# Patient Record
Sex: MALE | Race: WHITE | NOT HISPANIC OR LATINO
[De-identification: names, ages, dates, MRNs, and addresses within clinical notes are randomized per-mention and may not be internally consistent; named-entity substitution may affect disease eponyms.]

---

## 2020-07-02 ENCOUNTER — TRANSCRIPTION ENCOUNTER (OUTPATIENT)
Age: 50
End: 2020-07-02

## 2020-07-07 ENCOUNTER — TRANSCRIPTION ENCOUNTER (OUTPATIENT)
Age: 50
End: 2020-07-07

## 2023-11-16 PROBLEM — Z00.00 ENCOUNTER FOR PREVENTIVE HEALTH EXAMINATION: Status: ACTIVE | Noted: 2023-11-16

## 2024-02-07 ENCOUNTER — APPOINTMENT (OUTPATIENT)
Dept: PULMONOLOGY | Facility: CLINIC | Age: 54
End: 2024-02-07
Payer: COMMERCIAL

## 2024-02-07 VITALS
HEART RATE: 62 BPM | DIASTOLIC BLOOD PRESSURE: 74 MMHG | BODY MASS INDEX: 22.19 KG/M2 | TEMPERATURE: 97.6 F | WEIGHT: 155 LBS | HEIGHT: 70 IN | OXYGEN SATURATION: 98 % | SYSTOLIC BLOOD PRESSURE: 133 MMHG

## 2024-02-07 DIAGNOSIS — G47.00 INSOMNIA, UNSPECIFIED: ICD-10-CM

## 2024-02-07 DIAGNOSIS — Z78.9 OTHER SPECIFIED HEALTH STATUS: ICD-10-CM

## 2024-02-07 PROCEDURE — 99203 OFFICE O/P NEW LOW 30 MIN: CPT

## 2024-02-07 NOTE — CONSULT LETTER
[Dear  ___] : Dear  [unfilled], [Courtesy Letter:] : I had the pleasure of seeing your patient, [unfilled], in my office today. [Please see my note below.] : Please see my note below. [Consult Closing:] : Thank you very much for allowing me to participate in the care of this patient.  If you have any questions, please do not hesitate to contact me. [Sincerely,] : Sincerely, [FreeTextEntry3] : Daisy Diggs MD  [employer] at [facility] Pulmonary, Critical Care, and Sleep Medicine

## 2024-02-07 NOTE — HISTORY OF PRESENT ILLNESS
[FreeTextEntry1] : Mr. Velasco is a 54 y/o male with PMH of depression and anxiety who presents for evaluation of insomnia. He states that he would have these frequent episodes of trouble staying asleep and sometimes falling asleep. However, over the last several months he has been undergoing CBT which has significantly improved his sleep and he says for the last several months it is much improved. He denies significant smoking history. He drinks about 1 drink a night. He has family history of heart disease and mental illness in both sides. Does not snore as much as he used to and as he has increased his exercise over the last few years it has come down.   ESS of 8 [ESS] : 8

## 2024-02-07 NOTE — ASSESSMENT
[FreeTextEntry1] : 52 y/o male with PMH of anxiety and depression being managed for insomnia. He has been doing well with CBTi and overall his sleep is improved significantly. Instructed him on proper sleep hygiene including limiting alcohol, sedating, and stimulating agents in the evenings, limiting blue/white light before bed time, limiting caffeine intake. Discussed that his insomnia may also be sequalae of untreated depression/anxiety and if his anxiety returns it may need to be treated to help with his insomnia. Low suspicion for obstructive sleep apnea at this time. Discussed strategies such as jaw relaxation and paradoxical intention to help with insomnia.   F/u with Dr. Diggs as needed

## 2024-02-07 NOTE — PHYSICAL EXAM
[General Appearance - Well Developed] : well developed [Well Groomed] : well groomed [General Appearance - Well Nourished] : well nourished [Normal Oropharynx] : normal oropharynx [Neck Appearance] : the appearance of the neck was normal [] : no respiratory distress [Abnormal Walk] : normal gait [Exaggerated Use Of Accessory Muscles For Inspiration] : no accessory muscle use [Oriented To Time, Place, And Person] : oriented to person, place, and time

## 2024-02-07 NOTE — REVIEW OF SYSTEMS
[Negative] : Psychiatric [Difficulty Initiating Sleep] : difficulty falling asleep [Difficulty Maintaining Sleep] : difficulty maintaining sleep

## 2024-03-19 ENCOUNTER — APPOINTMENT (OUTPATIENT)
Dept: HEART AND VASCULAR | Facility: CLINIC | Age: 54
End: 2024-03-19
Payer: COMMERCIAL

## 2024-03-19 VITALS
HEART RATE: 59 BPM | TEMPERATURE: 96.9 F | DIASTOLIC BLOOD PRESSURE: 69 MMHG | WEIGHT: 158 LBS | OXYGEN SATURATION: 100 % | SYSTOLIC BLOOD PRESSURE: 128 MMHG | HEIGHT: 70 IN | BODY MASS INDEX: 22.62 KG/M2

## 2024-03-19 PROCEDURE — G2211 COMPLEX E/M VISIT ADD ON: CPT

## 2024-03-19 PROCEDURE — 93000 ELECTROCARDIOGRAM COMPLETE: CPT

## 2024-03-19 PROCEDURE — 99204 OFFICE O/P NEW MOD 45 MIN: CPT

## 2024-03-19 RX ORDER — ROSUVASTATIN CALCIUM 10 MG/1
10 TABLET, FILM COATED ORAL
Refills: 0 | Status: ACTIVE | COMMUNITY

## 2024-03-19 NOTE — REASON FOR VISIT
[Symptom and Test Evaluation] : symptom and test evaluation [FreeTextEntry1] : 54 year old man comes in for a visit. He recently had an abnormal Ca score and was started on a statin. He comes in for evaluation of chest pain and dyspnea. Symptoms are new. This is often noted with activity. Symptoms always improve at rest. No prior testing done. No issues with statin. FH of CAD noted.

## 2024-03-19 NOTE — PHYSICAL EXAM
[Well Developed] : well developed [Well Nourished] : well nourished [No Acute Distress] : no acute distress [Normal Conjunctiva] : normal conjunctiva [Normal Venous Pressure] : normal venous pressure [No Carotid Bruit] : no carotid bruit [Normal S1, S2] : normal S1, S2 [No Rub] : no rub [No Murmur] : no murmur [No Gallop] : no gallop [Good Air Entry] : good air entry [Clear Lung Fields] : clear lung fields [No Respiratory Distress] : no respiratory distress  [Soft] : abdomen soft [Non Tender] : non-tender [No Masses/organomegaly] : no masses/organomegaly [Normal Gait] : normal gait [Normal Bowel Sounds] : normal bowel sounds [No Edema] : no edema [No Cyanosis] : no cyanosis [No Clubbing] : no clubbing [No Varicosities] : no varicosities [No Rash] : no rash [No Skin Lesions] : no skin lesions [Moves all extremities] : moves all extremities [No Focal Deficits] : no focal deficits [Normal Speech] : normal speech [Normal memory] : normal memory [Alert and Oriented] : alert and oriented

## 2024-03-19 NOTE — DISCUSSION/SUMMARY
[FreeTextEntry1] : CP/SOB/dizziness check a stress echo if able to approve and schedule. VIVIENNE CROWE and I discussed his lipid panel and individualized target LDL goal. At this point, will do diet and exercise with anticipation of re-evaluating labs in 3-6 months EKG section of the chart --- secondary to symptoms above an electrocardiogram also known as an EKG was performed.  Risks and benefits discussed with the patient. Patient was given time and privacy to changed into a gown. Shortly after, standard 10 leads were applied and a [employer] system was used to perform the study. The results were subsequently reviewed by attending physician and discussed with the patient. The study showed a normal sinus rhythm and no ST-T suggestive of ischemia. Order for the EKG was placed in the chart. The results were documented. Billing submitted. [EKG obtained to assist in diagnosis and management of assessed problem(s)] : EKG obtained to assist in diagnosis and management of assessed problem(s)

## 2024-06-03 ENCOUNTER — APPOINTMENT (OUTPATIENT)
Dept: HEART AND VASCULAR | Facility: CLINIC | Age: 54
End: 2024-06-03
Payer: COMMERCIAL

## 2024-06-03 VITALS
DIASTOLIC BLOOD PRESSURE: 75 MMHG | HEART RATE: 64 BPM | OXYGEN SATURATION: 90 % | TEMPERATURE: 97 F | HEIGHT: 70 IN | WEIGHT: 149 LBS | BODY MASS INDEX: 21.33 KG/M2 | SYSTOLIC BLOOD PRESSURE: 124 MMHG

## 2024-06-03 DIAGNOSIS — E78.5 HYPERLIPIDEMIA, UNSPECIFIED: ICD-10-CM

## 2024-06-03 DIAGNOSIS — R07.2 PRECORDIAL PAIN: ICD-10-CM

## 2024-06-03 PROCEDURE — G2211 COMPLEX E/M VISIT ADD ON: CPT

## 2024-06-03 PROCEDURE — 93351 STRESS TTE COMPLETE: CPT

## 2024-06-03 PROCEDURE — 99214 OFFICE O/P EST MOD 30 MIN: CPT

## 2024-06-04 NOTE — DISCUSSION/SUMMARY
[FreeTextEntry1] : CP stress test reviewed. Inclined towards a conservative follow up in this patient. We had a careful discussion regarding diet and exercise. Will be happy to re-evaluate. HLD cont statin and labs with PMD carotid us if able to approve and schedule.

## 2024-06-04 NOTE — REASON FOR VISIT
[Symptom and Test Evaluation] : symptom and test evaluation [FreeTextEntry1] : 54 year old man comes in for a visit. He recently had an abnormal Ca score and was started on a statin. He comes in for evaluation of chest pain and dyspnea. Symptoms are new. This is often noted with activity. Symptoms always improve at rest. No prior testing done. No issues with statin. FH of CAD noted. Now, s/p stress ehco.

## 2024-10-16 ENCOUNTER — NON-APPOINTMENT (OUTPATIENT)
Age: 54
End: 2024-10-16

## 2024-10-16 ENCOUNTER — APPOINTMENT (OUTPATIENT)
Dept: HEART AND VASCULAR | Facility: CLINIC | Age: 54
End: 2024-10-16
Payer: COMMERCIAL

## 2024-10-16 VITALS
WEIGHT: 150.38 LBS | DIASTOLIC BLOOD PRESSURE: 73 MMHG | SYSTOLIC BLOOD PRESSURE: 131 MMHG | OXYGEN SATURATION: 100 % | HEIGHT: 70 IN | HEART RATE: 58 BPM | BODY MASS INDEX: 21.53 KG/M2 | TEMPERATURE: 97.9 F

## 2024-10-16 DIAGNOSIS — E78.5 HYPERLIPIDEMIA, UNSPECIFIED: ICD-10-CM

## 2024-10-16 DIAGNOSIS — Z87.898 PERSONAL HISTORY OF OTHER SPECIFIED CONDITIONS: ICD-10-CM

## 2024-10-16 DIAGNOSIS — R07.2 PRECORDIAL PAIN: ICD-10-CM

## 2024-10-16 PROCEDURE — 93880 EXTRACRANIAL BILAT STUDY: CPT

## 2024-10-16 PROCEDURE — 99214 OFFICE O/P EST MOD 30 MIN: CPT

## 2024-10-16 PROCEDURE — G2211 COMPLEX E/M VISIT ADD ON: CPT | Mod: NC

## 2024-10-17 PROBLEM — Z87.898 HISTORY OF INSOMNIA: Status: RESOLVED | Noted: 2024-02-07 | Resolved: 2024-10-17

## 2025-04-15 ENCOUNTER — NON-APPOINTMENT (OUTPATIENT)
Age: 55
End: 2025-04-15

## 2025-04-16 ENCOUNTER — APPOINTMENT (OUTPATIENT)
Dept: HEART AND VASCULAR | Facility: CLINIC | Age: 55
End: 2025-04-16
Payer: COMMERCIAL

## 2025-04-16 VITALS
HEART RATE: 59 BPM | HEIGHT: 70 IN | SYSTOLIC BLOOD PRESSURE: 130 MMHG | OXYGEN SATURATION: 100 % | BODY MASS INDEX: 21.33 KG/M2 | DIASTOLIC BLOOD PRESSURE: 67 MMHG | WEIGHT: 149 LBS

## 2025-04-16 DIAGNOSIS — R07.2 PRECORDIAL PAIN: ICD-10-CM

## 2025-04-16 DIAGNOSIS — E78.5 HYPERLIPIDEMIA, UNSPECIFIED: ICD-10-CM

## 2025-04-16 PROCEDURE — G2211 COMPLEX E/M VISIT ADD ON: CPT | Mod: NC

## 2025-04-16 PROCEDURE — 99214 OFFICE O/P EST MOD 30 MIN: CPT
